# Patient Record
Sex: MALE | HISPANIC OR LATINO | URBAN - METROPOLITAN AREA
[De-identification: names, ages, dates, MRNs, and addresses within clinical notes are randomized per-mention and may not be internally consistent; named-entity substitution may affect disease eponyms.]

---

## 2019-02-14 ENCOUNTER — OFFICE VISIT (OUTPATIENT)
Dept: URBAN - METROPOLITAN AREA CLINIC 18 | Facility: CLINIC | Age: 69
End: 2019-02-14
Payer: MEDICARE

## 2019-02-14 DIAGNOSIS — H26.493 OTHER SECONDARY CATARACT, BILATERAL: ICD-10-CM

## 2019-02-14 DIAGNOSIS — D23.10 OTH BENIGN NEOPLASM SKIN/ UNSP EYELID, INCLUDING CANTHUS: ICD-10-CM

## 2019-02-14 DIAGNOSIS — E11.9 TYPE 2 DIABETES MELLITUS W/O COMPLICATION: Primary | ICD-10-CM

## 2019-02-14 PROCEDURE — 92014 COMPRE OPH EXAM EST PT 1/>: CPT | Performed by: OPTOMETRIST

## 2019-02-14 ASSESSMENT — INTRAOCULAR PRESSURE
OD: 12
OS: 14

## 2019-02-14 ASSESSMENT — KERATOMETRY
OS: 43.75
OD: 43.50

## 2019-02-14 NOTE — IMPRESSION/PLAN
Impression: Oth benign neoplasm skin/ unsp eyelid, including canthus: D23.10. Lid lesion LLL. Plan: Discussed diagnosis in detail with patient. No treatment is required at this time. Will continue to observe condition and or symptoms. Pt states vision is not affected and does not affect vision.

## 2019-02-14 NOTE — IMPRESSION/PLAN
Impression: Type 2 diabetes mellitus w/o complication: Q96.9. Plan: Diabetes type II: no background retinopathy, no signs of neovascularization noted. Discussed ocular and systemic benefits of blood sugar control.

## 2019-02-14 NOTE — IMPRESSION/PLAN
Impression: Other secondary cataract, bilateral: H26.493. Plan: Discussed diagnosis in detail with patient. Ok to schedule YAG with Dr. Sydney Briceno next available.

## 2019-03-21 ENCOUNTER — SURGERY (OUTPATIENT)
Dept: URBAN - METROPOLITAN AREA SURGERY 7 | Facility: SURGERY | Age: 69
End: 2019-03-21
Payer: MEDICARE

## 2019-03-21 PROCEDURE — 66821 AFTER CATARACT LASER SURGERY: CPT | Performed by: OPHTHALMOLOGY
